# Patient Record
Sex: MALE | Race: OTHER | HISPANIC OR LATINO | Employment: PART TIME | URBAN - METROPOLITAN AREA
[De-identification: names, ages, dates, MRNs, and addresses within clinical notes are randomized per-mention and may not be internally consistent; named-entity substitution may affect disease eponyms.]

---

## 2017-04-24 ENCOUNTER — HOSPITAL ENCOUNTER (EMERGENCY)
Facility: MEDICAL CENTER | Age: 27
End: 2017-04-24
Attending: EMERGENCY MEDICINE
Payer: MEDICAID

## 2017-04-24 VITALS
TEMPERATURE: 98.4 F | HEIGHT: 68 IN | BODY MASS INDEX: 37.92 KG/M2 | WEIGHT: 250.22 LBS | DIASTOLIC BLOOD PRESSURE: 78 MMHG | SYSTOLIC BLOOD PRESSURE: 120 MMHG | HEART RATE: 98 BPM | OXYGEN SATURATION: 98 % | RESPIRATION RATE: 18 BRPM

## 2017-04-24 DIAGNOSIS — J02.0 STREP THROAT: ICD-10-CM

## 2017-04-24 LAB
DEPRECATED S PYO AG THROAT QL EIA: ABNORMAL
SIGNIFICANT IND 70042: ABNORMAL
SITE SITE: ABNORMAL
SOURCE SOURCE: ABNORMAL

## 2017-04-24 PROCEDURE — 700111 HCHG RX REV CODE 636 W/ 250 OVERRIDE (IP): Performed by: EMERGENCY MEDICINE

## 2017-04-24 PROCEDURE — 87880 STREP A ASSAY W/OPTIC: CPT

## 2017-04-24 PROCEDURE — 99284 EMERGENCY DEPT VISIT MOD MDM: CPT

## 2017-04-24 PROCEDURE — A9270 NON-COVERED ITEM OR SERVICE: HCPCS | Performed by: EMERGENCY MEDICINE

## 2017-04-24 PROCEDURE — 700102 HCHG RX REV CODE 250 W/ 637 OVERRIDE(OP): Performed by: EMERGENCY MEDICINE

## 2017-04-24 PROCEDURE — 96372 THER/PROPH/DIAG INJ SC/IM: CPT

## 2017-04-24 RX ORDER — DEXAMETHASONE 4 MG/1
10 TABLET ORAL ONCE
Status: COMPLETED | OUTPATIENT
Start: 2017-04-24 | End: 2017-04-24

## 2017-04-24 RX ADMIN — PENICILLIN G BENZATHINE 1.2 MILLION UNITS: 1200000 INJECTION, SUSPENSION INTRAMUSCULAR at 21:13

## 2017-04-24 RX ADMIN — DEXAMETHASONE 10 MG: 4 TABLET ORAL at 21:13

## 2017-04-24 ASSESSMENT — LIFESTYLE VARIABLES: DO YOU DRINK ALCOHOL: NO

## 2017-04-24 NOTE — ED AVS SNAPSHOT
TwoChop Access Code: [email or web address]  Expires: 5/24/2017  8:45 PM    TwoChop  A secure, online tool to manage your health information     [employer]’s TwoChop® is a secure, online tool that connects you to your personalized health information from the privacy of your home -- day or night - making it very easy for you to manage your healthcare. Once the activation process is completed, you can even access your medical information using the TwoChop reena, which is available for free in the Apple Reena store or Google Play store.     TwoChop provides the following levels of access (as shown below):   My Chart Features   [facility] Primary Care Doctor [facility]  Specialists [facility]  Urgent  Care Non-[facility]  Primary Care  Doctor   Email your healthcare team securely and privately 24/7 X X X X   Manage appointments: schedule your next appointment; view details of past/upcoming appointments X      Request prescription refills. X      View recent personal medical records, including lab and immunizations X X X X   View health record, including health history, allergies, medications X X X X   Read reports about your outpatient visits, procedures, consult and ER notes X X X X   See your discharge summary, which is a recap of your hospital and/or ER visit that includes your diagnosis, lab results, and care plan. X X       How to register for TwoChop:  1. Go to  [email or web address].  2. Click on the Sign Up Now box, which takes you to the New Member Sign Up page. You will need to provide the following information:  a. Enter your TwoChop Access Code exactly as it appears at the top of this page. (You will not need to use this code after you’ve completed the sign-up process. If you do not sign up before the expiration date, you must request a new code.)   b. Enter your date of birth.   c. Enter your home email address.   d. Click Submit, and follow the next screen’s instructions.  3. Create a TwoChop ID. This will be your TwoChop  login ID and cannot be changed, so think of one that is secure and easy to remember.  4. Create a [employer] password. You can change your password at any time.  5. Enter your Password Reset Question and Answer. This can be used at a later time if you forget your password.   6. Enter your e-mail address. This allows you to receive e-mail notifications when new information is available in [employer].  7. Click Sign Up. You can now view your health information.    For assistance activating your [employer] account, call [phone]

## 2017-04-24 NOTE — ED AVS SNAPSHOT
Home Care Instructions                                                                                                                Chance Fowler   MRN: [id number]    Department:  [facility], Emergency Dept   Date of Visit:  4/24/2017            [facility], Emergency Dept    [address] [address]    Phone:  [phone]      You were seen by     Ar Washburn D.O.      Your Diagnosis Was     Strep throat     J02.0       Follow-up Information     1. Follow up with [facility]. Call in 1 day.    Contact information    [address] [address] [phone]        2. Follow up with [facility], Emergency Dept.    Specialty:  Emergency Medicine    Why:  If symptoms worsen    Contact information    [address] [place] [address] [phone]      Medication Information     Review all of your home medications and newly ordered medications with your primary doctor and/or pharmacist as soon as possible. Follow medication instructions as directed by your doctor and/or pharmacist.     Please keep your complete medication list with you and share with your physician. Update the information when medications are discontinued, doses are changed, or new medications (including over-the-counter products) are added; and carry medication information at all times in the event of emergency situations.               Medication List      Notice     You have not been prescribed any medications.            Procedures and tests performed during your visit     RAPID STREP, CULT IF INDICATED (CULTURE IF NEGATIVE)        Discharge Instructions       Salt Water Gargle  This solution will help make your mouth and throat feel better.  HOME CARE INSTRUCTIONS   · Mix 1 teaspoon of salt in 8 ounces of warm water.  · Gargle with this solution as much or often as you need or as directed. Swish and gargle gently if you have any sores or wounds in  "your mouth.  · Do not swallow this mixture.     This information is not intended to replace advice given to you by your health care provider. Make sure you discuss any questions you have with your health care provider.     Document Released: 09/21/2005 Document Revised: 03/11/2013 Document Reviewed: 02/12/2010  [employer] Patient Education ©2016 Elsevier Inc.    Strep Throat  Strep throat is an infection of the throat caused by a bacteria named Streptococcus pyogenes. Your health care provider may call the infection streptococcal \"tonsillitis\" or \"pharyngitis\" depending on whether there are signs of inflammation in the tonsils or back of the throat. Strep throat is most common in children aged 5-15 years during the cold months of the year, but it can occur in people of any age during any season. This infection is spread from person to person (contagious) through coughing, sneezing, or other close contact.  SIGNS AND SYMPTOMS   · Fever or chills.  · Painful, swollen, red tonsils or throat.  · Pain or difficulty when swallowing.  · White or yellow spots on the tonsils or throat.  · Swollen, tender lymph nodes or \"glands\" of the neck or under the jaw.  · Red rash all over the body (rare).  DIAGNOSIS   Many different infections can cause the same symptoms. A test must be done to confirm the diagnosis so the right treatment can be given. A \"rapid strep test\" can help your health care provider make the diagnosis in a few minutes. If this test is not available, a light swab of the infected area can be used for a throat culture test. If a throat culture test is done, results are usually available in a day or two.  TREATMENT   Strep throat is treated with antibiotic medicine.  HOME CARE INSTRUCTIONS   · Gargle with 1 tsp of salt in 1 cup of warm water, 3-4 times per day or as needed for comfort.  · Family members who also have a sore throat or fever should be tested for strep throat and treated with antibiotics if " they have the strep infection.  · Make sure everyone in your household washes their hands well.  · Do not share food, drinking cups, or personal items that could cause the infection to spread to others.  · You may need to eat a soft food diet until your sore throat gets better.  · Drink enough water and fluids to keep your urine clear or pale yellow. This will help prevent dehydration.  · Get plenty of rest.  · Stay home from school, day care, or work until you have been on antibiotics for 24 hours.  · Take medicines only as directed by your health care provider.  · Take your antibiotic medicine as directed by your health care provider. Finish it even if you start to feel better.  SEEK MEDICAL CARE IF:   · The glands in your neck continue to enlarge.  · You develop a rash, cough, or earache.  · You cough up green, yellow-brown, or bloody sputum.  · You have pain or discomfort not controlled by medicines.  · Your problems seem to be getting worse rather than better.  · You have a fever.  SEEK IMMEDIATE MEDICAL CARE IF:   · You develop any new symptoms such as vomiting, severe headache, stiff or painful neck, chest pain, shortness of breath, or trouble swallowing.  · You develop severe throat pain, drooling, or changes in your voice.  · You develop swelling of the neck, or the skin on the neck becomes red and tender.  · You develop signs of dehydration, such as fatigue, dry mouth, and decreased urination.  · You become increasingly sleepy, or you cannot wake up completely.  MAKE SURE YOU:  · Understand these instructions.  · Will watch your condition.  · Will get help right away if you are not doing well or get worse.     This information is not intended to replace advice given to you by your health care provider. Make sure you discuss any questions you have with your health care provider.     Document Released: 12/15/2001 Document Revised: 01/08/2016 Document Reviewed: 04/11/2016  [employer] Patient  Education ©2016 Elsevier Inc.            Patient Information     Patient Information    Following emergency treatment: all patient requiring follow-up care must return either to a private physician or a clinic if your condition worsens before you are able to obtain further medical attention, please return to the emergency room.     Billing Information    At [facility], we work to make the billing process streamlined for our patients.  Our Representatives are here to answer any questions you may have regarding your hospital bill.  If you have insurance coverage and have supplied your insurance information to us, we will submit a claim to your insurer on your behalf.  Should you have any questions regarding your bill, we can be reached online or by phone as follows:  Online: You are able pay your bills online or live chat with our representatives about any billing questions you may have. We are here to help Monday - Friday from 8:00am to 7:30pm and 9:00am - 12:00pm on Saturdays.  Please visit [email or web address]  for more information.   Phone:  [phone] or [phone]    Please note that your emergency physician, surgeon, pathologist, radiologist, anesthesiologist, and other specialists are not employed by [facility] and will therefore bill separately for their services.  Please contact them directly for any questions concerning their bills at the numbers below:     Emergency Physician Services:  [phone]  [facility]:  [phone]  [facility]:  [phone]  [facility]:  [phone]    1. Your final bill may vary from the amount quoted upon discharge if all procedures are not complete at that time, or if your doctor has additional procedures of which we are not aware. You will receive an additional bill if you return to the Emergency Department at [facility] for suture removal regardless of the facility of which the  sutures were placed.     2. Please arrange for settlement of this account at the emergency registration.    3. All self-pay accounts are due in full at the time of treatment.  If you are unable to meet this obligation then payment is expected within 4-5 days.     4. If you have had radiology studies (CT, X-ray, Ultrasound, MRI), you have received a preliminary result during your emergency department visit. Please contact the radiology department [phone] to receive a copy of your final result. Please discuss the Final result with your primary physician or with the follow up physician provided.     Crisis Hotline:  [place] Crisis Hotline:  5-882-ZFTFPWB or 1-214.303.7986  [place] Crisis Hotline:    1-184.229.1962 or 515-718-7080         ED Discharge Follow Up Questions    1. In order to provide you with very good care, we would like to follow up with a phone call in the next few days.  May we have your permission to contact you?     YES /  NO    2. What is the best phone number to call you? (       )_____-__________    3. What is the best time to call you?      Morning  /  Afternoon  /  Evening                   Patient Signature:  ____________________________________________________________    Date:  ____________________________________________________________

## 2017-04-24 NOTE — ED AVS SNAPSHOT
4/24/2017    Chance Fowler  [address] [place] [address] [address] [place] [address]    Dear Chance:    [facility] wants to ensure your discharge home is safe and you or your loved ones have had all of your questions answered regarding your care after you leave the hospital.    Below is a list of resources and contact information should you have any questions regarding your hospital stay, follow-up instructions, or active medical symptoms.    Questions or Concerns Regarding… Contact   Medical Questions Related to Your Discharge  (7 days a week, 8am-5pm) Contact a Nurse Care Coordinator   [phone]   Medical Questions Not Related to Your Discharge  (24 hours a day / 7 days a week)  Contact the Nurse Health Line   [phone]    Medications or Discharge Instructions Refer to your discharge packet   or contact your [facility] Primary Care Provider   [phone]   Follow-up Appointment(s) Schedule your appointment via Factabase   or contact Scheduling [phone]   Billing Review your statement via Factabase  or contact Billing [phone]   Medical Records Review your records via Factabase   or contact Medical Records [phone]     You may receive a telephone call within two days of discharge. This call is to make certain you understand your discharge instructions and have the opportunity to have any questions answered. You can also easily access your medical information, test results and upcoming appointments via the Factabase free online health management tool. You can learn more and sign up at [employer]/Factabase. For assistance setting up your Factabase account, please call [phone].    Once again, we want to ensure your discharge home is safe and that you have a clear understanding of any next steps in your care. If you have any questions or concerns, please do not hesitate to contact us, we are here for you. Thank you for choosing [facility] for your healthcare needs.    Sincerely,    Your [facility] Healthcare Team

## 2017-04-25 NOTE — ED NOTES
Pt to triage .  Chief Complaint   Patient presents with   • Sore Throat     since yesterday    • Swollen Glands

## 2017-04-25 NOTE — ED PROVIDER NOTES
" ED Provider Note    Scribed for Ar Washburn D.O. by Irene Yañez. 4/24/2017  7:20 PM    Primary care provider: Pcp Pt States None   History obtained from: patient and his wife  History limited by: None     CHIEF COMPLAINT  Chief Complaint   Patient presents with   • Sore Throat     since yesterday    • Swollen Glands        HPI    Chance Fowler is a 26 y.o. male who presents to the ED for sore throat onset yesterday. Patient reports associated difficulty swallowing and congestion. He states associated fever of 101.8 °F today. Denies cough or rhinorrhea. Patient reports associate nausea and vomiting. He states 2 episodes of emesis yesterday. Denies vomiting today. Denies dysuria or diarrhea. Denies rash. Denies positive sick contact. Patient states he traveled to [place] 5 months ago. The patient denies any past pertinent medical history, use of daily medications or allergies to medications.    Patient resides with his wife to the ED complaining of sore throat since yesterday. Hurts to swallow. Wife states she had similar symptoms last year due to strep throat.    REVIEW OF SYSTEMS  Please see HPI for pertinent positives/negatives.     PAST MEDICAL HISTORY  History reviewed. No pertinent past medical history.     SURGICAL HISTORY  History reviewed. No pertinent past surgical history.     SOCIAL HISTORY  Social History     Social History Main Topics   • Smoking status: Never Smoker    • Alcohol Use: No      Comment: rarely   • Drug Use: No      FAMILY HISTORY  History reviewed. No pertinent family history.     CURRENT MEDICATIONS  Home Medications     Reviewed by Susie Fisher R.N. (Registered Nurse) on 04/24/17 at 1727  Med List Status: Complete    Medication Last Dose Status          Patient Juancho Taking any Medications                       ALLERGIES  No Known Allergies     PHYSICAL EXAM  VITAL SIGNS: /72 mmHg  Pulse 108  Temp(Src) 37 °C (98.6 °F)  Resp 16  Ht 1.727 m (5' 8\")  Wt 113.5 kg (250 lb 3.6 " oz)  BMI 38.05 kg/m2  SpO2 95% @OLIMPIA[830275::@     Pulse ox interpretation: 95% I interpret this pulse ox as normal     Constitutional: Well developed, well nourished, alert in no apparent distress, nontoxic appearance    HENT: No external signs of trauma, normocephalic, bilateral external ears normal, TMs are clear bilaterally, oropharynx moist and with bilateral tonsillar erythema and mild hypertrophy with exudates, uvula midline and airway patent, no trismus, voice appears normal, nose normal    Eyes: PERRL, conjunctiva without erythema, no discharge, no icterus    Neck: Soft and supple, trachea midline, no stridor, no tenderness, no LAD, no JVD, good ROM    Cardiovascular: Tachycardia, no murmurs/rubs/gallops, strong distal pulses and good perfusion    Thorax & Lungs: No respiratory distress, CTAB, no chest tenderness    Abdomen: Soft, nontender, nondistended, no guarding, no rebound, normal BS    Back: No CVAT    Extremities: No clubbing, no cyanosis, no edema, no gross deformity, good ROM, no tenderness, intact distal pulses with brisk cap refill    Skin: Warm, dry, no pallor/cyanosis, no rash noted    Lymphatic: No lymphadenopathy noted    Neuro: A/O times 3, no focal deficits noted    Psychiatric: Cooperative, normal mood and affect, normal judgement, appropriate for clinical situation      DIAGNOSTIC STUDIES / PROCEDURES    LABS  All labs reviewed by me.     Results for orders placed or performed during the hospital encounter of 04/24/17   RAPID STREP, CULT IF INDICATED (CULTURE IF NEGATIVE)   Result Value Ref Range    Significant Indicator POS (POS)     Source THRT     Site THROAT     Rapid Strep Screen Positive for Group A streptococcus. (A)       COURSE & MEDICAL DECISION MAKING  Nursing notes, VS, PMSFHx reviewed in chart.     7:23 PM Patient was seen at bedside. Patient presents with sore throat and difficulty swallowing. I informed a strep test will be ordered for further evaluation.     Differential  diagnoses considered include but are not limited to: URI, pneumonia, bronchitis, influenza, laryngitis, pharyngitis/tonsillitis, epiglottitis, peritonsillar abscess, retropharyngeal abscess, sinusitis, mononucleosis, viral syndrome, allergic rhinitis, otitis media  Rapid strep was ordered to evaluate for strep throat.    8:32 PM Recheck: Patient is resting comfortably and reports feeling improved. I updated him on his results, which showed he was positive for strep. I explained that he is now stable for discharge after antibiotic treatment is administered here. I advised him to follow up with his primary care provider and to return to the ED for fever, worsening difficulty swallowing or any other medical problems. He understands and will comply.     Patient was treated with Bicillin-LA injection 1.2 million units at this time.    Patient presents with wife to the ED for above complaint. Rapid strep was positive. Patient was given the option of Bicillin injection or oral penicillin and he prefers to have the injection here. He also received a dose of Decadron. This is a pleasant patient in no acute distress, nontoxic in appearance with no signs of airway impairment. Discussed with him reasons for follow-up as well as return to ED precautions. They verbalized understanding and agree with plan of care with no further questions or concerns.    The patient is referred to a primary physician for blood pressure management, diabetic screening, and for all other preventative health concerns.     FINAL IMPRESSION  1. Strep throat         DISPOSITION  Patient will be discharged home in stable condition.     FOLLOW UP  [address] [address]  [phone]  Call in 1 day      [facility], Emergency Dept  [address] [facility] [address] [phone]    If symptoms worsen       OUTPATIENT MEDICATIONS  New Prescriptions    No medications on file       I, Irene SOSA  Pari (Scribe), am scribing for, and in the presence of, Ar Washburn D.O..    Electronically signed by: Irene Yañez (Scribe), 4/24/2017    IAr D.O. personally performed the services described in this documentation, as scribed by Irene Yañez in my presence, and it is both accurate and complete.    Portions of this record were made with voice recognition software and by scribes.  Despite my review, spelling/grammar/context errors may still remain.  Interpretation of this chart should be taken in this context.

## 2017-04-25 NOTE — ED NOTES
Pt ambulatory.  Vital signs stable.   Pt states understanding of discharge instructions and paperwork.   Pt states they will follow up with PCP.   Pt states they have a safe way home.

## 2017-04-25 NOTE — DISCHARGE INSTRUCTIONS
"Salt Water Gargle  This solution will help make your mouth and throat feel better.  HOME CARE INSTRUCTIONS   · Mix 1 teaspoon of salt in 8 ounces of warm water.  · Gargle with this solution as much or often as you need or as directed. Swish and gargle gently if you have any sores or wounds in your mouth.  · Do not swallow this mixture.     This information is not intended to replace advice given to you by your health care provider. Make sure you discuss any questions you have with your health care provider.     Document Released: 09/21/2005 Document Revised: 03/11/2013 Document Reviewed: 02/12/2010  [employer] Interactive Patient Education ©2016 Elsevier Inc.    Strep Throat  Strep throat is an infection of the throat caused by a bacteria named Streptococcus pyogenes. Your health care provider may call the infection streptococcal \"tonsillitis\" or \"pharyngitis\" depending on whether there are signs of inflammation in the tonsils or back of the throat. Strep throat is most common in children aged 5-15 years during the cold months of the year, but it can occur in people of any age during any season. This infection is spread from person to person (contagious) through coughing, sneezing, or other close contact.  SIGNS AND SYMPTOMS   · Fever or chills.  · Painful, swollen, red tonsils or throat.  · Pain or difficulty when swallowing.  · White or yellow spots on the tonsils or throat.  · Swollen, tender lymph nodes or \"glands\" of the neck or under the jaw.  · Red rash all over the body (rare).  DIAGNOSIS   Many different infections can cause the same symptoms. A test must be done to confirm the diagnosis so the right treatment can be given. A \"rapid strep test\" can help your health care provider make the diagnosis in a few minutes. If this test is not available, a light swab of the infected area can be used for a throat culture test. If a throat culture test is done, results are usually available in a day or two.  TREATMENT "   Strep throat is treated with antibiotic medicine.  HOME CARE INSTRUCTIONS   · Gargle with 1 tsp of salt in 1 cup of warm water, 3-4 times per day or as needed for comfort.  · Family members who also have a sore throat or fever should be tested for strep throat and treated with antibiotics if they have the strep infection.  · Make sure everyone in your household washes their hands well.  · Do not share food, drinking cups, or personal items that could cause the infection to spread to others.  · You may need to eat a soft food diet until your sore throat gets better.  · Drink enough water and fluids to keep your urine clear or pale yellow. This will help prevent dehydration.  · Get plenty of rest.  · Stay home from school, day care, or work until you have been on antibiotics for 24 hours.  · Take medicines only as directed by your health care provider.  · Take your antibiotic medicine as directed by your health care provider. Finish it even if you start to feel better.  SEEK MEDICAL CARE IF:   · The glands in your neck continue to enlarge.  · You develop a rash, cough, or earache.  · You cough up green, yellow-brown, or bloody sputum.  · You have pain or discomfort not controlled by medicines.  · Your problems seem to be getting worse rather than better.  · You have a fever.  SEEK IMMEDIATE MEDICAL CARE IF:   · You develop any new symptoms such as vomiting, severe headache, stiff or painful neck, chest pain, shortness of breath, or trouble swallowing.  · You develop severe throat pain, drooling, or changes in your voice.  · You develop swelling of the neck, or the skin on the neck becomes red and tender.  · You develop signs of dehydration, such as fatigue, dry mouth, and decreased urination.  · You become increasingly sleepy, or you cannot wake up completely.  MAKE SURE YOU:  · Understand these instructions.  · Will watch your condition.  · Will get help right away if you are not doing well or get worse.     This  information is not intended to replace advice given to you by your health care provider. Make sure you discuss any questions you have with your health care provider.     Document Released: 12/15/2001 Document Revised: 01/08/2016 Document Reviewed: 04/11/2016  [employer] Interactive Patient Education ©2016 Elsevier Inc.

## 2017-05-21 ENCOUNTER — HOSPITAL ENCOUNTER (EMERGENCY)
Facility: MEDICAL CENTER | Age: 27
End: 2017-05-22
Attending: EMERGENCY MEDICINE
Payer: MEDICAID

## 2017-05-21 DIAGNOSIS — S01.81XA FACIAL LACERATION, INITIAL ENCOUNTER: ICD-10-CM

## 2017-05-21 PROCEDURE — 303747 HCHG EXTRA SUTURE

## 2017-05-21 PROCEDURE — 304217 HCHG IRRIGATION SYSTEM

## 2017-05-21 PROCEDURE — 304999 HCHG REPAIR-SIMPLE/INTERMED LEVEL 1

## 2017-05-21 PROCEDURE — 700101 HCHG RX REV CODE 250: Performed by: EMERGENCY MEDICINE

## 2017-05-21 PROCEDURE — 99283 EMERGENCY DEPT VISIT LOW MDM: CPT

## 2017-05-21 PROCEDURE — 12013 RPR F/E/E/N/L/M 2.6-5.0 CM: CPT

## 2017-05-21 RX ORDER — LIDOCAINE HYDROCHLORIDE 10 MG/ML
20 INJECTION, SOLUTION INFILTRATION; PERINEURAL ONCE
Status: COMPLETED | OUTPATIENT
Start: 2017-05-21 | End: 2017-05-21

## 2017-05-21 RX ADMIN — LIDOCAINE HYDROCHLORIDE 20 ML: 10 INJECTION, SOLUTION INFILTRATION; PERINEURAL at 23:45

## 2017-05-21 NOTE — ED AVS SNAPSHOT
Home Care Instructions                                                                                                                Chance Fowler   MRN: [id number]    Department:  [facility], Emergency Dept   Date of Visit:  5/21/2017            [facility] Emergency Dept    [address] [place] [address]    Phone:  [phone]      You were seen by     Amauri Garcia M.D.      Your Diagnosis Was     Facial laceration, initial encounter     S01.81XA       These are the medications you received during your hospitalization from 05/21/2017 2221 to 05/22/2017 0011     Date/Time Order Dose Route Action    05/21/2017 2345 lidocaine (XYLOCAINE) 1%  injection 20 mL Other Given      Follow-up Information     1. Follow up with [facility], Emergency Dept In 5 days.    Specialty:  Emergency Medicine    Why:  For suture removal, For wound re-check    Contact information    [address] [place] [id number] [phone]        2. Follow up with [facility], Emergency Dept.    Specialty:  Emergency Medicine    Why:  If symptoms worsen    Contact information    [address] [place] [address] [phone]      Medication Information     Review all of your home medications and newly ordered medications with your primary doctor and/or pharmacist as soon as possible. Follow medication instructions as directed by your doctor and/or pharmacist.     Please keep your complete medication list with you and share with your physician. Update the information when medications are discontinued, doses are changed, or new medications (including over-the-counter products) are added; and carry medication information at all times in the event of emergency situations.               Medication List      Notice     You have not been prescribed any medications.            Patient Information     Patient Information    Following emergency treatment:  all patient requiring follow-up care must return either to a private physician or a clinic if your condition worsens before you are able to obtain further medical attention, please return to the emergency room.     Billing Information    At [facility], we work to make the billing process streamlined for our patients.  Our Representatives are here to answer any questions you may have regarding your hospital bill.  If you have insurance coverage and have supplied your insurance information to us, we will submit a claim to your insurer on your behalf.  Should you have any questions regarding your bill, we can be reached online or by phone as follows:  Online: You are able pay your bills online or live chat with our representatives about any billing questions you may have. We are here to help Monday - Friday from 8:00am to 7:30pm and 9:00am - 12:00pm on Saturdays.  Please visit [email or web address]  for more information.   Phone:  [phone] or [phone]    Please note that your emergency physician, surgeon, pathologist, radiologist, anesthesiologist, and other specialists are not employed by [facility] and will therefore bill separately for their services.  Please contact them directly for any questions concerning their bills at the numbers below:     Emergency Physician Services:  [phone]  [facility]:  [phone]  [facility]:  [phone]  [facility]:  [phone]    1. Your final bill may vary from the amount quoted upon discharge if all procedures are not complete at that time, or if your doctor has additional procedures of which we are not aware. You will receive an additional bill if you return to the Emergency Department at [facility] for suture removal regardless of the facility of which the sutures were placed.     2. Please arrange for settlement of this account at the emergency registration.    3. All  self-pay accounts are due in full at the time of treatment.  If you are unable to meet this obligation then payment is expected within 4-5 days.     4. If you have had radiology studies (CT, X-ray, Ultrasound, MRI), you have received a preliminary result during your emergency department visit. Please contact the radiology department [phone] to receive a copy of your final result. Please discuss the Final result with your primary physician or with the follow up physician provided.     Crisis Hotline:  [place] Crisis Hotline:  0-575-HQLJKKJ or 1-929.611.8541  [place] Crisis Hotline:    1-199.769.6868 or 440-239-3599         ED Discharge Follow Up Questions    1. In order to provide you with very good care, we would like to follow up with a phone call in the next few days.  May we have your permission to contact you?     YES /  NO    2. What is the best phone number to call you? (       )_____-__________    3. What is the best time to call you?      Morning  /  Afternoon  /  Evening                   Patient Signature:  ____________________________________________________________    Date:  ____________________________________________________________

## 2017-05-21 NOTE — ED AVS SNAPSHOT
5/22/2017    Chance Fowler  [address] [place] [address] [address] [place] [address]    Dear Chance:    [facility] wants to ensure your discharge home is safe and you or your loved ones have had all of your questions answered regarding your care after you leave the hospital.    Below is a list of resources and contact information should you have any questions regarding your hospital stay, follow-up instructions, or active medical symptoms.    Questions or Concerns Regarding… Contact   Medical Questions Related to Your Discharge  (7 days a week, 8am-5pm) Contact a Nurse Care Coordinator   [phone]   Medical Questions Not Related to Your Discharge  (24 hours a day / 7 days a week)  Contact the Nurse Health Line   [phone]    Medications or Discharge Instructions Refer to your discharge packet   or contact your [facility] Primary Care Provider   [phone]   Follow-up Appointment(s) Schedule your appointment via [employer]   or contact Scheduling [phone]   Billing Review your statement via [employer]  or contact Billing [phone]   Medical Records Review your records via [employer]   or contact Medical Records [phone]     You may receive a telephone call within two days of discharge. This call is to make certain you understand your discharge instructions and have the opportunity to have any questions answered. You can also easily access your medical information, test results and upcoming appointments via the [employer] free online health management tool. You can learn more and sign up at [employer]. For assistance setting up your [employer] account, please call [phone].    Once again, we want to ensure your discharge home is safe and that you have a clear understanding of any next steps in your care. If you have any questions or concerns, please do not hesitate to contact us, we are here for you. Thank you for choosing [facility] for your healthcare needs.    Sincerely,    Your [facility] Healthcare Team

## 2017-05-21 NOTE — ED AVS SNAPSHOT
Reamaze Access Code: [email or web address]  Expires: 5/24/2017  8:45 PM    Reamaze  A secure, online tool to manage your health information     [employer] Reamaze® is a secure, online tool that connects you to your personalized health information from the privacy of your home -- day or night - making it very easy for you to manage your healthcare. Once the activation process is completed, you can even access your medical information using the Reamaze reena, which is available for free in the Apple Reena store or Google Play store.     Reamaze provides the following levels of access (as shown below):   My Chart Features   [facility] Primary Care Doctor [facility]  Specialists [facility]  Urgent  Care Non-[facility]  Primary Care  Doctor   Email your healthcare team securely and privately 24/7 X X X X   Manage appointments: schedule your next appointment; view details of past/upcoming appointments X      Request prescription refills. X      View recent personal medical records, including lab and immunizations X X X X   View health record, including health history, allergies, medications X X X X   Read reports about your outpatient visits, procedures, consult and ER notes X X X X   See your discharge summary, which is a recap of your hospital and/or ER visit that includes your diagnosis, lab results, and care plan. X X       How to register for Reamaze:  1. Go to  [email or web address].  2. Click on the Sign Up Now box, which takes you to the New Member Sign Up page. You will need to provide the following information:  a. Enter your Reamaze Access Code exactly as it appears at the top of this page. (You will not need to use this code after you’ve completed the sign-up process. If you do not sign up before the expiration date, you must request a new code.)   b. Enter your date of birth.   c. Enter your home email address.   d. Click Submit, and follow the next screen’s instructions.  3. Create a Reamaze ID. This will be your Reamaze  login ID and cannot be changed, so think of one that is secure and easy to remember.  4. Create a miiCard password. You can change your password at any time.  5. Enter your Password Reset Question and Answer. This can be used at a later time if you forget your password.   6. Enter your e-mail address. This allows you to receive e-mail notifications when new information is available in miiCard.  7. Click Sign Up. You can now view your health information.    For assistance activating your miiCard account, call [phone]

## 2017-05-22 VITALS
BODY MASS INDEX: 38.59 KG/M2 | WEIGHT: 253.75 LBS | SYSTOLIC BLOOD PRESSURE: 134 MMHG | TEMPERATURE: 98.2 F | RESPIRATION RATE: 16 BRPM | OXYGEN SATURATION: 97 % | HEART RATE: 72 BPM | DIASTOLIC BLOOD PRESSURE: 89 MMHG

## 2017-05-22 PROCEDURE — 12013 RPR F/E/E/N/L/M 2.6-5.0 CM: CPT

## 2017-05-22 PROCEDURE — 304999 HCHG REPAIR-SIMPLE/INTERMED LEVEL 1

## 2017-05-22 NOTE — ED NOTES
All lines and monitors d/c'd. Discharge paperwork and medications reviewed. Pt states he understands and has no questions. Pt encouraged to follow-up with PCP and come back to ER with worsening symptoms. Instructed not to drive after taking pain medication. Pt verbalizes understanding. Pt ambulated out of the ED with steady gait.

## 2017-05-22 NOTE — ED PROVIDER NOTES
ED Provider Note    ED Provider Note        CHIEF COMPLAINT  Chief Complaint   Patient presents with   • Facial Laceration     lac to right side of forehead. pt cut his head on sharp piece on couch. bleeding controlled.        ROMEL Fowler is a 26 y.o. male who presents to the Emergency Department chief complaint of right facial laceration. Patient was moving furniture was carrying a couch when he was caught on the left eyebrow by sharp piece of jagged metal. Moderate pain bleeding controlled with direct pressure last tetanus was one year prior. No loss of consciousness no headache no neck pain. He reports no other acute injuries no other acute concerns at this time.    REVIEW OF SYSTEMS  Pertinent positives include right eyebrow laceration. Pertinent negatives include no loss of consciousness headache neck pain.    PAST MEDICAL HISTORY     Patient denies  SURGICAL HISTORY  patient denies any surgical history    SOCIAL HISTORY  Social History   Substance Use Topics   • Smoking status: Never Smoker    • Smokeless tobacco: None   • Alcohol Use: No      Comment: rarely      History   Drug Use No       FAMILY HISTORY  Non-contributory    CURRENT MEDICATIONS  Home Medications     Reviewed by Dory Carrington R.N. (Registered Nurse) on 05/21/17 at 2235  Med List Status: Complete    Medication Last Dose Status          Patient Juancho Taking any Medications                        ALLERGIES  No Known Allergies    PHYSICAL EXAM  VITAL SIGNS: /98 mmHg  Pulse 99  Temp(Src) 36.7 °C (98 °F)  Resp 18  Wt 115.1 kg (253 lb 12 oz)  SpO2 99%  Constitutional: Alert and oriented x 3, no acute distress.  HENT: Normocephalic, Atraumatic, Bilateral external ears normal. Nose normal. Patient has approximately 3 cm jagged laceration through the right eyebrow superficial and subcutaneous involvement and deep structure involvement  Eyes: Pupils are equal and reactive. Conjunctiva normal, non-icteric.   Heart: Regular rate and  rythm, no murmurs, equal pulses peripherally x 4.    Lungs: Clear to auscultation bilaterally, no wheezes rales or rhonchi  GI: Soft nontender nondistended positive bowel sounds.  Skin: Warm, Dry, No erythema, No rash.   Neurologic: Cranial nerves III through XII grossly intact no sensory deficit no cerebellar dysfunction.   Psychiatric: Appropriate affect for situation      Laceration Repair Procedure Note    Indication: Laceration    Procedure: The patient was placed in the appropriate position and anesthesia around the laceration was obtained by infiltration using 1% Lidocaine without epinephrine. The area was then irrigated with high pressure normal saline. The laceration was closed with 4-0 Ethilon using interrupted sutures. There were no additional lacerations requiring repair. The wound area was then dressed with a bandage.      Total repaired wound length: 3 cm.     Other Items: Suture count: 5    The patient tolerated the procedure well.    Complications: None        Medical Decision Making: Instructed to return in 5 days for wound check and suture removal. Clean dry for 24 hours then bacitracin copiously. Return for worsening pain and redness swelling drainage any other acute concerns discharged him stable condition    /89 mmHg  Pulse 72  Temp(Src) 36.8 °C (98.2 °F)  Resp 16  Wt 115.1 kg (253 lb 12 oz)  SpO2 97%    [facility], Emergency Dept  [address] [facility] [address] [address] [phone]  In 5 days  For suture removal, For wound re-check    [facility], Emergency Dept  [address] [place] [address] [phone]    If symptoms worsen        FINAL IMPRESSION  1. Facial laceration, initial encounter         This dictation has been created using voice recognition software and/or scribes. The accuracy of the dictation is limited by the abilities of the software and the expertise of the scribes. I expect there may be some errors of grammar  and possibly content. I made every attempt to manually correct the errors within my dictation. However, errors related to voice recognition software and/or scribes may still exist and should be interpreted within the appropriate context.

## 2017-05-22 NOTE — DISCHARGE INSTRUCTIONS
Facial Laceration   A facial laceration is a cut on the face. These injuries can be painful and cause bleeding. Lacerations usually heal quickly, but they need special care to reduce scarring.  DIAGNOSIS   Your health care provider will take a medical history, ask for details about how the injury occurred, and examine the wound to determine how deep the cut is.  TREATMENT   Some facial lacerations may not require closure. Others may not be able to be closed because of an increased risk of infection. The risk of infection and the chance for successful closure will depend on various factors, including the amount of time since the injury occurred.  The wound may be cleaned to help prevent infection. If closure is appropriate, pain medicines may be given if needed. Your health care provider will use stitches (sutures), wound glue (adhesive), or skin adhesive strips to repair the laceration. These tools bring the skin edges together to allow for faster healing and a better cosmetic outcome. If needed, you may also be given a tetanus shot.  HOME CARE INSTRUCTIONS  · Only take over-the-counter or prescription medicines as directed by your health care provider.  · Follow your health care provider's instructions for wound care. These instructions will vary depending on the technique used for closing the wound.  For Sutures:  · Keep the wound clean and dry.    · If you were given a bandage (dressing), you should change it at least once a day. Also change the dressing if it becomes wet or dirty, or as directed by your health care provider.    · Wash the wound with soap and water 2 times a day. Rinse the wound off with water to remove all soap. Pat the wound dry with a clean towel.    · After cleaning, apply a thin layer of the antibiotic ointment recommended by your health care provider. This will help prevent infection and keep the dressing from sticking.    · You may shower as usual after the first 24 hours. Do not soak the  wound in water until the sutures are removed.    · Get your sutures removed as directed by your health care provider. With facial lacerations, sutures should usually be taken out after 4-5 days to avoid stitch marks.    · Wait a few days after your sutures are removed before applying any makeup.  For Skin Adhesive Strips:  · Keep the wound clean and dry.    · Do not get the skin adhesive strips wet. You may bathe carefully, using caution to keep the wound dry.    · If the wound gets wet, pat it dry with a clean towel.    · Skin adhesive strips will fall off on their own. You may trim the strips as the wound heals. Do not remove skin adhesive strips that are still stuck to the wound. They will fall off in time.    For Wound Adhesive:  · You may briefly wet your wound in the shower or bath. Do not soak or scrub the wound. Do not swim. Avoid periods of heavy sweating until the skin adhesive has fallen off on its own. After showering or bathing, gently pat the wound dry with a clean towel.    · Do not apply liquid medicine, cream medicine, ointment medicine, or makeup to your wound while the skin adhesive is in place. This may loosen the film before your wound is healed.    · If a dressing is placed over the wound, be careful not to apply tape directly over the skin adhesive. This may cause the adhesive to be pulled off before the wound is healed.    · Avoid prolonged exposure to sunlight or tanning lamps while the skin adhesive is in place.  · The skin adhesive will usually remain in place for 5-10 days, then naturally fall off the skin. Do not pick at the adhesive film.    After Healing:  Once the wound has healed, cover the wound with sunscreen during the day for 1 full year. This can help minimize scarring. Exposure to ultraviolet light in the first year will darken the scar. It can take 1-2 years for the scar to lose its redness and to heal completely.   SEEK MEDICAL CARE IF:  · You have a fever.  SEEK IMMEDIATE  MEDICAL CARE IF:  · You have redness, pain, or swelling around the wound.    · You see a yellowish-white fluid (pus) coming from the wound.       This information is not intended to replace advice given to you by your health care provider. Make sure you discuss any questions you have with your health care provider.     Document Released: 01/25/2006 Document Revised: 01/08/2016 Document Reviewed: 07/31/2014  [employer] Interactive Patient Education ©2016 [employer]

## 2017-05-22 NOTE — ED NOTES
Chief Complaint   Patient presents with   • Facial Laceration     lac to right side of forehead. pt cut his head on sharp piece on couch. bleeding controlled.

## 2017-05-28 ENCOUNTER — HOSPITAL ENCOUNTER (EMERGENCY)
Facility: MEDICAL CENTER | Age: 27
End: 2017-05-28
Payer: MEDICAID

## 2017-05-28 VITALS
DIASTOLIC BLOOD PRESSURE: 77 MMHG | SYSTOLIC BLOOD PRESSURE: 119 MMHG | RESPIRATION RATE: 18 BRPM | OXYGEN SATURATION: 98 % | TEMPERATURE: 98 F | HEART RATE: 80 BPM

## 2017-05-28 PROCEDURE — 99281 EMR DPT VST MAYX REQ PHY/QHP: CPT

## 2017-05-29 NOTE — ED NOTES
Pt ambualatory to triage requesting suture removal from right eyebrow laceration. No redness or swelling noted, no s/s of infection. 5 sutures removed without difficulty. Pt ambulatory with steady gait out of ER.

## 2019-02-05 ENCOUNTER — NON-PROVIDER VISIT (OUTPATIENT)
Dept: OCCUPATIONAL MEDICINE | Facility: CLINIC | Age: 29
End: 2019-02-05

## 2019-02-05 ENCOUNTER — OFFICE VISIT (OUTPATIENT)
Dept: OCCUPATIONAL MEDICINE | Facility: CLINIC | Age: 29
End: 2019-02-05

## 2019-02-05 DIAGNOSIS — Z02.89 VISIT FOR OCCUPATIONAL HEALTH EXAMINATION: ICD-10-CM

## 2019-02-05 DIAGNOSIS — Z02.4 ENCOUNTER FOR CDL (COMMERCIAL DRIVING LICENSE) EXAM: ICD-10-CM

## 2019-02-05 PROCEDURE — 8907 PR URINE COLLECT ONLY: Performed by: FAMILY MEDICINE

## 2019-02-05 PROCEDURE — 7100 PR DOT PHYSICAL: Performed by: FAMILY MEDICINE

## 2019-07-25 ENCOUNTER — NON-PROVIDER VISIT (OUTPATIENT)
Dept: OCCUPATIONAL MEDICINE | Facility: CLINIC | Age: 29
End: 2019-07-25

## 2019-07-25 DIAGNOSIS — Z02.1 PRE-EMPLOYMENT DRUG SCREENING: ICD-10-CM

## 2019-07-25 PROCEDURE — 8907 PR URINE COLLECT ONLY: Performed by: NURSE PRACTITIONER

## 2021-06-02 ENCOUNTER — OFFICE VISIT (OUTPATIENT)
Dept: URGENT CARE | Facility: PHYSICIAN GROUP | Age: 31
End: 2021-06-02
Payer: COMMERCIAL

## 2021-06-02 ENCOUNTER — HOSPITAL ENCOUNTER (OUTPATIENT)
Facility: MEDICAL CENTER | Age: 31
End: 2021-06-02
Attending: PHYSICIAN ASSISTANT
Payer: COMMERCIAL

## 2021-06-02 VITALS
OXYGEN SATURATION: 96 % | TEMPERATURE: 98.6 F | DIASTOLIC BLOOD PRESSURE: 62 MMHG | BODY MASS INDEX: 38.06 KG/M2 | RESPIRATION RATE: 16 BRPM | SYSTOLIC BLOOD PRESSURE: 124 MMHG | HEART RATE: 90 BPM | WEIGHT: 257 LBS | HEIGHT: 69 IN

## 2021-06-02 DIAGNOSIS — L02.415 ABSCESS OF RIGHT LOWER EXTREMITY: ICD-10-CM
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
## 2021-06-02 DIAGNOSIS — S80.861A INSECT BITE OF RIGHT LOWER LEG, INITIAL ENCOUNTER: ICD-10-CM

## 2021-06-02 PROCEDURE — 87205 SMEAR GRAM STAIN: CPT

## 2021-06-02 PROCEDURE — 87077 CULTURE AEROBIC IDENTIFY: CPT | Mod: 91

## 2021-06-02 PROCEDURE — 87070 CULTURE OTHR SPECIMN AEROBIC: CPT

## 2021-06-02 PROCEDURE — 99203 OFFICE O/P NEW LOW 30 MIN: CPT | Performed by: PHYSICIAN ASSISTANT

## 2021-06-02 PROCEDURE — 87186 SC STD MICRODIL/AGAR DIL: CPT

## 2021-06-02 RX ORDER — SULFAMETHOXAZOLE AND TRIMETHOPRIM 800; 160 MG/1; MG/1
1 TABLET ORAL 2 TIMES DAILY
Qty: 14 TABLET | Refills: 0 | Status: SHIPPED | OUTPATIENT
Start: 2021-06-02 | End: 2021-06-09

## 2021-06-02 ASSESSMENT — ENCOUNTER SYMPTOMS
FEVER: 0
WEAKNESS: 1
TINGLING: 0
DIZZINESS: 1
VOMITING: 0
SENSORY CHANGE: 0
NAUSEA: 0
ROS SKIN COMMENTS: ABSCESS
CHILLS: 1
HEADACHES: 0

## 2021-06-02 NOTE — LETTER
June 2, 2021         Patient: Chance Perez   YOB: 1990   Date of Visit: 6/2/2021           To Whom it May Concern:    Chance Perez was seen in my clinic on 6/2/2021. Please excuse him from work 6/3 - 6/4.    If you have any questions or concerns, please don't hesitate to call.        Sincerely,           Juhi Ramos P.A.-C.  Electronically Signed

## 2021-06-02 NOTE — LETTER
June 2, 2021         Patient: Chance Perez   YOB: 1990   Date of Visit: 6/2/2021           To Whom it May Concern:    Chance Perez was seen in my clinic on 6/2/2021. He {Return to school/sport/work:89450}    If you have any questions or concerns, please don't hesitate to call.        Sincerely,           Juhi Ramos P.A.-C.  Electronically Signed

## 2021-06-02 NOTE — PROGRESS NOTES
"Subjective:   Chance Perez is a 30 y.o. male who presents for Insect Bite (R foot/ankle, swelling, red, painful, white discharge x1 day )      HPI  30 y.o. male presents to urgent care with new problem to provider of abscess over right lower calf with spontaneous purulent drainage noted yesterday while at work. Patient reports recent hx of similar with possible black  spider bite a few months ago. Patient reports he felt mildly weak, dizziness, and feverish yesterday after noting possible spider bite.   Denies other associated aggravating or alleviating factors.     Review of Systems   Constitutional: Positive for chills and malaise/fatigue. Negative for fever.   Gastrointestinal: Negative for nausea and vomiting.   Skin:        abscess   Neurological: Positive for dizziness and weakness. Negative for tingling, sensory change and headaches.       There is no problem list on file for this patient.    History reviewed. No pertinent surgical history.  Social History     Tobacco Use   • Smoking status: Never Smoker   • Smokeless tobacco: Never Used   Substance Use Topics   • Alcohol use: No     Comment: rarely   • Drug use: No      History reviewed. No pertinent family history.   (Allergies, Medications, & Tobacco/Substance Use were reconciled by the Medical Assistant and reviewed by myself. The family history is prepopulated)     Objective:     /62 (BP Location: Left arm, Patient Position: Sitting, BP Cuff Size: Large adult)   Pulse 90   Temp 37 °C (98.6 °F) (Temporal)   Resp 16   Ht 1.753 m (5' 9\")   Wt 117 kg (257 lb)   SpO2 96%   BMI 37.95 kg/m²     Physical Exam  Vitals reviewed.   Constitutional:       General: He is not in acute distress.     Appearance: Normal appearance. He is well-developed. He is not ill-appearing.   HENT:      Head: Normocephalic and atraumatic.   Eyes:      Conjunctiva/sclera: Conjunctivae normal.   Cardiovascular:      Rate and Rhythm: Normal rate.   Pulmonary:      " Effort: Pulmonary effort is normal. No respiratory distress.   Musculoskeletal:      Cervical back: Normal range of motion and neck supple.   Skin:     General: Skin is warm and dry.          Neurological:      General: No focal deficit present.      Mental Status: He is alert and oriented to person, place, and time.   Psychiatric:         Mood and Affect: Mood normal.         Behavior: Behavior normal.         Thought Content: Thought content normal.         Judgment: Judgment normal.         Assessment/Plan:     1. Abscess of right lower extremity  sulfamethoxazole-trimethoprim (BACTRIM DS) 800-160 MG tablet    CULTURE WOUND W/ GRAM STAIN   2. Insect bite of right lower leg, initial encounter       Patient reports history of similar episode with possible black  spider bite that occurred a few months ago.  Excoriated abscess was opened with an 18 gauge needle in sterile fashion and small amount of purulent drainage was manual expressed until draining blood. Recommend warm compresses and continue manual drainage of purulent material. 7 day course of bactrim sent to pharmacy. Ibuprofen for inflammation and pain. Discussed red flags and reasons to return to UC or proceed to ED including necrosis.     Differential diagnosis, natural history, supportive care, and indications for immediate follow-up discussed.    Advised the patient to follow-up with the primary care physician for recheck, reevaluation, and consideration of further management.  Patient verbalized understanding of treatment plan and has no further questions regarding care.     I personally reviewed prior external notes and test results pertinent to today's visit.     Please note that this dictation was created using voice recognition software. I have made a reasonable attempt to correct obvious errors, but I expect that there are errors of grammar and possibly content that I did not discover before finalizing the note.    This note was electronically  signed by Juhi Ramos PA-C

## 2021-06-03 LAB
GRAM STN SPEC: NORMAL
SIGNIFICANT IND 70042: NORMAL
SITE SITE: NORMAL
SOURCE SOURCE: NORMAL

## 2021-06-04 LAB
BACTERIA WND AEROBE CULT: ABNORMAL
BACTERIA WND AEROBE CULT: ABNORMAL
GRAM STN SPEC: ABNORMAL
SIGNIFICANT IND 70042: ABNORMAL
SITE SITE: ABNORMAL
SOURCE SOURCE: ABNORMAL

## 2022-02-12 ENCOUNTER — HOSPITAL ENCOUNTER (OUTPATIENT)
Facility: MEDICAL CENTER | Age: 32
End: 2022-02-12
Attending: FAMILY MEDICINE
Payer: COMMERCIAL

## 2022-02-12 ENCOUNTER — OFFICE VISIT (OUTPATIENT)
Dept: URGENT CARE | Facility: CLINIC | Age: 32
End: 2022-02-12
Payer: COMMERCIAL

## 2022-02-12 VITALS
RESPIRATION RATE: 16 BRPM | HEART RATE: 86 BPM | OXYGEN SATURATION: 97 % | DIASTOLIC BLOOD PRESSURE: 78 MMHG | WEIGHT: 252 LBS | SYSTOLIC BLOOD PRESSURE: 126 MMHG | TEMPERATURE: 98.9 F | BODY MASS INDEX: 37.33 KG/M2 | HEIGHT: 69 IN

## 2022-02-12 DIAGNOSIS — R05.9 COUGH: ICD-10-CM

## 2022-02-12 DIAGNOSIS — R06.2 WHEEZE: ICD-10-CM

## 2022-02-12 PROCEDURE — 99213 OFFICE O/P EST LOW 20 MIN: CPT | Performed by: FAMILY MEDICINE

## 2022-02-12 PROCEDURE — U0005 INFEC AGEN DETEC AMPLI PROBE: HCPCS

## 2022-02-12 PROCEDURE — U0003 INFECTIOUS AGENT DETECTION BY NUCLEIC ACID (DNA OR RNA); SEVERE ACUTE RESPIRATORY SYNDROME CORONAVIRUS 2 (SARS-COV-2) (CORONAVIRUS DISEASE [COVID-19]), AMPLIFIED PROBE TECHNIQUE, MAKING USE OF HIGH THROUGHPUT TECHNOLOGIES AS DESCRIBED BY CMS-2020-01-R: HCPCS

## 2022-02-12 RX ORDER — BENZONATATE 200 MG/1
200 CAPSULE ORAL 3 TIMES DAILY PRN
Qty: 30 CAPSULE | Refills: 0 | Status: SHIPPED | OUTPATIENT
Start: 2022-02-12

## 2022-02-12 RX ORDER — ALBUTEROL SULFATE 90 UG/1
2 AEROSOL, METERED RESPIRATORY (INHALATION) EVERY 4 HOURS PRN
Qty: 1 EACH | Refills: 0 | Status: SHIPPED | OUTPATIENT
Start: 2022-02-12

## 2022-02-12 ASSESSMENT — ENCOUNTER SYMPTOMS
HEADACHES: 1
DIARRHEA: 0
VOMITING: 0
EYE REDNESS: 0
WEIGHT LOSS: 0
MYALGIAS: 0
EYE DISCHARGE: 0

## 2022-02-12 NOTE — LETTER
February 12, 2022         Patient: Chance Perez   YOB: 1990   Date of Visit: 2/12/2022           To Whom it May Concern:    Chance Perez was seen in my clinic on 2/12/2022. Please excuse work absences. COVID 19 testing is pending.     Sincerely,           Francisco Dubon M.D.  Electronically Signed

## 2022-02-12 NOTE — PROGRESS NOTES
"Rory Perez is a 31 y.o. male who presents with Cough (1x day, stuffy nose, runny nose, dry throat, headache )            Onset yesterday morning productive cough with scant non-bloody sputum. 3 weeks ago C19 + with 2 days fatigue as the only symptoms. No fever. SOB with cough. +wheeze. No PMH asthma/pneumonia although he suspects exercised induced asthma and frequently wheezes with illness. No other aggravating or alleviating factors.        Review of Systems   Constitutional: Negative for malaise/fatigue and weight loss.   Eyes: Negative for discharge and redness.   Gastrointestinal: Negative for diarrhea and vomiting.   Musculoskeletal: Negative for joint pain and myalgias.   Skin: Negative for itching and rash.   Neurological: Positive for headaches (mild).              Objective     /78 (BP Location: Left arm, Patient Position: Sitting, BP Cuff Size: Adult)   Pulse 86   Temp 37.2 °C (98.9 °F) (Temporal)   Resp 16   Ht 1.753 m (5' 9\")   Wt 114 kg (252 lb)   SpO2 97%   BMI 37.21 kg/m²      Physical Exam  Constitutional:       General: He is not in acute distress.     Appearance: He is well-developed.   HENT:      Head: Normocephalic and atraumatic.      Nose: Rhinorrhea present. No congestion.      Mouth/Throat:      Mouth: Mucous membranes are moist.      Pharynx: No posterior oropharyngeal erythema.   Eyes:      Conjunctiva/sclera: Conjunctivae normal.   Cardiovascular:      Rate and Rhythm: Normal rate and regular rhythm.      Heart sounds: Normal heart sounds. No murmur heard.  Pulmonary:      Effort: Pulmonary effort is normal.      Breath sounds: Wheezing (few) present.   Skin:     General: Skin is warm and dry.      Findings: No rash.   Neurological:      Mental Status: He is alert and oriented to person, place, and time.                              Assessment & Plan        1. Cough  benzonatate (TESSALON) 200 MG capsule    COVID/SARS CoV-2 PCR   2. Wheeze  albuterol 108 " (90 Base) MCG/ACT Aero Soln inhalation aerosol     Differential diagnosis, natural history, supportive care, and indications for immediate follow-up discussed at length.     Follow-up COVID-19 testing.

## 2022-02-13 DIAGNOSIS — R05.9 COUGH: ICD-10-CM

## 2022-02-13 LAB
COVID ORDER STATUS COVID19: NORMAL
SARS-COV-2 RNA RESP QL NAA+PROBE: NOTDETECTED
SPECIMEN SOURCE: NORMAL